# Patient Record
Sex: FEMALE | Race: WHITE | NOT HISPANIC OR LATINO | Employment: UNEMPLOYED | URBAN - NONMETROPOLITAN AREA
[De-identification: names, ages, dates, MRNs, and addresses within clinical notes are randomized per-mention and may not be internally consistent; named-entity substitution may affect disease eponyms.]

---

## 2018-08-21 ENCOUNTER — TRANSCRIBE ORDERS (OUTPATIENT)
Dept: ULTRASOUND IMAGING | Facility: HOSPITAL | Age: 38
End: 2018-08-21

## 2018-08-21 DIAGNOSIS — R74.8 ABNORMAL LEVELS OF OTHER SERUM ENZYMES: Primary | ICD-10-CM

## 2018-08-30 ENCOUNTER — APPOINTMENT (OUTPATIENT)
Dept: ULTRASOUND IMAGING | Facility: HOSPITAL | Age: 38
End: 2018-08-30

## 2018-09-04 ENCOUNTER — HOSPITAL ENCOUNTER (OUTPATIENT)
Dept: ULTRASOUND IMAGING | Facility: HOSPITAL | Age: 38
Discharge: HOME | End: 2018-09-04
Admitting: INTERNAL MEDICINE

## 2018-09-04 DIAGNOSIS — R74.8 ABNORMAL LEVELS OF OTHER SERUM ENZYMES: ICD-10-CM

## 2018-09-04 PROCEDURE — 76705 ECHO EXAM OF ABDOMEN: CPT

## 2018-12-12 ENCOUNTER — PROCEDURE VISIT (OUTPATIENT)
Dept: OBSTETRICS AND GYNECOLOGY | Facility: CLINIC | Age: 38
End: 2018-12-12

## 2018-12-12 VITALS
DIASTOLIC BLOOD PRESSURE: 72 MMHG | BODY MASS INDEX: 27.16 KG/M2 | WEIGHT: 169 LBS | SYSTOLIC BLOOD PRESSURE: 128 MMHG | HEIGHT: 66 IN

## 2018-12-12 DIAGNOSIS — N84.1 CERVICAL POLYP: ICD-10-CM

## 2018-12-12 DIAGNOSIS — Z01.419 WELL WOMAN EXAM WITH ROUTINE GYNECOLOGICAL EXAM: Primary | ICD-10-CM

## 2018-12-12 DIAGNOSIS — Z12.4 SCREENING FOR MALIGNANT NEOPLASM OF CERVIX: ICD-10-CM

## 2018-12-12 PROCEDURE — 99385 PREV VISIT NEW AGE 18-39: CPT | Performed by: OBSTETRICS & GYNECOLOGY

## 2018-12-12 PROCEDURE — 57500 BIOPSY OF CERVIX: CPT | Performed by: OBSTETRICS & GYNECOLOGY

## 2018-12-12 NOTE — PROGRESS NOTES
"Subjective   Chief Complaint   Patient presents with   • Gynecologic Exam     LMP: 17, last pap:  WNL, HX of LEEP     Ngoc Palomares is a 38 y.o. year old  presenting to be seen for a well woman exam.    She has no complaints today.  Her menses is regular, occurring every 25-28 days.  Her periods typically last 6-7 days and are typically not heavy or painful.  She denies intermenstrual bleeding.  Her last menstrual period was 17.    She is sexually active and denies sexual dysfunction.  She underwent a BTL in .    She denies urinary symptoms, prolapse.    Pap smear:  - normal.  She does have a history of abnormal Pap smears requiring a LEEP in .  Mammogram: never  Colonoscopy: never  DEXA Scan: never    She denies nausea, emesis, fevers, chills, significant weight changes, hair/skin/nail changes, dysuria, urinary frequency, palpitations, chest pain, headaches, myalgia, dyspnea.     History  History reviewed. No pertinent past medical history., Past Surgical History:   Procedure Laterality Date   • CERVICAL BIOPSY  W/ LOOP ELECTRODE EXCISION     •  SECTION     • D&C WITH SUCTION     • TUBAL ABDOMINAL LIGATION     , History reviewed. No pertinent family history., Social History     Tobacco Use   • Smoking status: Current Every Day Smoker     Packs/day: 0.50   • Smokeless tobacco: Never Used   Substance Use Topics   • Alcohol use: Not on file   • Drug use: Not on file   ,   (Not in a hospital admission) and Allergies:  Patient has no known allergies.    No current outpatient medications on file prior to visit.     No current facility-administered medications on file prior to visit.        Social History    Tobacco Use      Smoking status: Current Every Day Smoker        Packs/day: 0.50      Smokeless tobacco: Never Used      Review of Systems  Pertinent items are noted in HPI, all other systems were reviewed and negative       Objective   /72   Ht 167.6 cm (66\")   " Wt 76.7 kg (169 lb)   LMP 11/26/2018   BMI 27.28 kg/m²     Physical Exam:  General Appearance: alert, pleasant, appears stated age, interactive and cooperative  Head: normocephalic, without obvious abnormality and atraumatic  Eyes: lids and lashes normal and no icterus  Ears: ears appear intact with no abnormalities noted  Nose: nares normal, septum midline, mucosa normal and no drainage  Neck: suppple, trachea midline and no thyromegaly  Back: no kyphosis present, no scoliosis present and range of motion normal  Lungs: respirations regular, respirations even and respirations unlabored, clear to auscultation bilaterally   Heart: regular rhythm and normal rate, normal S1, S2, no murmur, gallop, or rubs and no click  Breasts: Examined in supine position  Symmetric without masses or skin dimpling  Nipples normal without inversion, lesions or discharge  There are no palpable axillary nodes  Abdomen: no masses, no hepatomegaly, no splenomegaly, soft non-tender, no guarding and no rebound tenderness  Extremities: moves extremities well, no edema, no cyanosis and no redness  Skin: no bleeding, bruising or rash and no lesions noted  Lymph Nodes: no palpable adenopathy  Neurologic: Cranial Nerves cranial nerves 2 - 12 grossly intact, Speech normal content and execusion, Coordination normal  Psych: normal mood and affect, oriented to person, time and place, thought content organized and appropriate judgment    Pelvis:  Pelvic: Clinical staff was present for exam  External genitalia:  normal appearance of the external genitalia including Bartholin's and Quemado's glands.  :  urethral meatus normal;  Vagina:  normal pink mucosa without prolapse or lesions.  Cervix:  Small, roughly 1 cm mass protruding from the cervical os, erythematous, likely polyp.  Otherwise normal appearing cervix.  Uterus:  normal size, shape and consistency.  Adnexa:  normal bimanual exam of the adnexa.  Rectal:  digital rectal exam not performed; anus  visually normal appearing.    Review of Labs:   No data reviewed    Review of Imaging:  No data reviewed    Decision to obtain old records:  No.    Summarization of old records:  N/A    Independent review of image, tracing or specimen:  N/A       Assessment   1.  Well woman exam with age-appropriate health screening  2.  Cervical polyp     Plan    No orders of the defined types were placed in this encounter.    Medications ordered: none    Procedures performed: Pap smear, cervical biopsy    Cervical biopsy    A speculum was placed in the vagina to visualize the cervix.  The cervix was cleaned with Betadine.  Kevorkian biopsy forceps were used to grasp the cervical polyp and remove it with a turning technique.  Hemostasis was achieved with silver nitrate.  The specimen was labeled and sent to pathology for review.    - Pap screening guidelines reviewed  - Yearly clinical breast and pelvic exams recommended regardless of pap recommendations  - Dexa scan not indicated  - Colonoscopy not indicated  - Healthy diet and exercise encouraged  - Calcium and Vitamin D requirements reviewed  - Contraception: BTL  - Screening: none  - Seat belt use encouraged    Follow up in 1 year for annual exam    Fernando Hernandez MD  Obstetrics and Gynecology  [facility]

## 2018-12-18 DIAGNOSIS — N84.1 CERVICAL POLYP: ICD-10-CM

## 2018-12-20 DIAGNOSIS — Z12.4 SCREENING FOR MALIGNANT NEOPLASM OF CERVIX: ICD-10-CM
